# Patient Record
Sex: MALE | Race: WHITE | NOT HISPANIC OR LATINO | Employment: OTHER | URBAN - METROPOLITAN AREA
[De-identification: names, ages, dates, MRNs, and addresses within clinical notes are randomized per-mention and may not be internally consistent; named-entity substitution may affect disease eponyms.]

---

## 2022-11-09 ENCOUNTER — OFFICE VISIT (OUTPATIENT)
Dept: NEUROLOGY | Facility: CLINIC | Age: 77
End: 2022-11-09

## 2022-11-09 VITALS
DIASTOLIC BLOOD PRESSURE: 90 MMHG | OXYGEN SATURATION: 97 % | BODY MASS INDEX: 32.2 KG/M2 | WEIGHT: 230 LBS | HEIGHT: 71 IN | SYSTOLIC BLOOD PRESSURE: 150 MMHG | HEART RATE: 65 BPM

## 2022-11-09 DIAGNOSIS — G70.00 MYASTHENIA GRAVIS WITHOUT EXACERBATION: Primary | ICD-10-CM

## 2022-11-09 PROCEDURE — 99214 OFFICE O/P EST MOD 30 MIN: CPT | Performed by: PSYCHIATRY & NEUROLOGY

## 2022-11-09 RX ORDER — PYRIDOSTIGMINE BROMIDE 60 MG/1
60 TABLET ORAL 3 TIMES DAILY
COMMUNITY

## 2022-11-09 RX ORDER — LISINOPRIL AND HYDROCHLOROTHIAZIDE 25; 20 MG/1; MG/1
1 TABLET ORAL DAILY
COMMUNITY

## 2022-11-09 RX ORDER — ATORVASTATIN CALCIUM 10 MG/1
10 TABLET, FILM COATED ORAL DAILY
COMMUNITY

## 2022-11-09 RX ORDER — ALFUZOSIN HYDROCHLORIDE 10 MG/1
10 TABLET, EXTENDED RELEASE ORAL DAILY
COMMUNITY

## 2022-11-09 NOTE — PROGRESS NOTES
Subjective:     Notes by MA:  Patient here today for follow up of Myasthenia Gravis. Patient reports he is doing well on Mestinon, no new or worsening symptoms.    Patient ID: Leeroy Osuna is a 77 y.o. male.    History of Present Illness  The following portions of the patient's history were reviewed and updated as appropriate: allergies, current medications, past family history, past medical history, past social history, past surgical history and problem list.    Review of Systems   Constitutional: Negative for activity change, appetite change and fatigue.   HENT: Negative for facial swelling, trouble swallowing and voice change.    Eyes: Negative for photophobia, pain and visual disturbance.   Respiratory: Negative for chest tightness, shortness of breath and wheezing.    Cardiovascular: Negative for chest pain, palpitations and leg swelling.   Gastrointestinal: Negative for abdominal pain, nausea and vomiting.   Endocrine: Positive for cold intolerance (hands). Negative for heat intolerance.   Musculoskeletal: Positive for gait problem. Negative for arthralgias, back pain, joint swelling, myalgias, neck pain and neck stiffness.   Neurological: Negative for dizziness, tremors, seizures, syncope, facial asymmetry, speech difficulty, weakness, light-headedness, numbness and headaches.   Hematological: Does not bruise/bleed easily.   Psychiatric/Behavioral: Negative for agitation, behavioral problems, confusion, decreased concentration, dysphoric mood, hallucinations, self-injury, sleep disturbance and suicidal ideas. The patient is not nervous/anxious and is not hyperactive.         Objective:    Neurologic Exam     Mental Status   Oriented to person, place, and time.   Speech: speech is normal   Level of consciousness: alert  Knowledge: good.     Cranial Nerves   Cranial nerves II through XII intact.     Motor Exam   Muscle bulk: normal    Strength   Right neck flexion: 5/5  Left neck flexion: 5/5  Right neck  extension: 5/5  Left neck extension: 5/5  Right deltoid: 5/5  Left deltoid: 5/5  Right biceps: 5/5  Left biceps: 5/5  Right triceps: 5/5  Left triceps: 5/5  Right wrist flexion: 5/5  Left wrist flexion: 5/5  Right wrist extension: 5/5  Left wrist extension: 5/5  Right interossei: 5/5  Left interossei: 5/5  Right abdominals: 5/5  Left abdominals: 5/5  Right iliopsoas: 5/5  Left iliopsoas: 5/5  Right quadriceps: 5/5  Left quadriceps: 5/5  Right hamstrin/5  Left hamstrin/5  Right glutei: 5/5  Left glutei: 5/5  Right anterior tibial: 5/5  Left anterior tibial: 5/5  Right posterior tibial: 5/5  Left posterior tibial: 5/5  Right peroneal: 5/5  Left peroneal: 5/5  Right gastroc: 5/5  Left gastroc: 5/5    Gait, Coordination, and Reflexes     Reflexes   Right brachioradialis: 1+  Left brachioradialis: 1+  Right biceps: 1+  Left biceps: 1+  Right triceps: 1+  Left triceps: 1+  Right patellar: 1+  Left patellar: 1+  Right achilles: 1+  Left achilles: 1+  Right : 1+  Left : 1+      Physical Exam  Neurological:      Mental Status: He is oriented to person, place, and time.      Cranial Nerves: Cranial nerves 2-12 are intact.      Deep Tendon Reflexes:      Reflex Scores:       Tricep reflexes are 1+ on the right side and 1+ on the left side.       Bicep reflexes are 1+ on the right side and 1+ on the left side.       Brachioradialis reflexes are 1+ on the right side and 1+ on the left side.       Patellar reflexes are 1+ on the right side and 1+ on the left side.       Achilles reflexes are 1+ on the right side and 1+ on the left side.  Psychiatric:         Speech: Speech normal.         Assessment/Plan:     Diagnoses and all orders for this visit:    1. Myasthenia gravis without exacerbation (HCC) (Primary)     75-year-old gentleman with antibody positive myasthenia gravis.  Doing well with symptomatic therapy only.  Tolerating his Mestinon at 60 mg 3 times daily we will continue that unchanged.  Bowel issues  have improved significantly with the addition of psyllium.  I reviewed his previous work-ups with him and once again, we talked the about the importance of avoiding potentially exacerbating medications or situations.  Again, his chest CT revealed no residual thymoma or thymus gland.  Finally, we will continue symptomatic therapy only and will hold off on immune modulatory therapy if we can, especially in the light of his history of diabetes and prostate cancer.  We will see him back in 4 months, earlier if necessary.    30 minutes patient care time today.

## 2023-03-09 ENCOUNTER — OFFICE VISIT (OUTPATIENT)
Dept: NEUROLOGY | Facility: CLINIC | Age: 78
End: 2023-03-09
Payer: MEDICARE

## 2023-03-09 VITALS
SYSTOLIC BLOOD PRESSURE: 140 MMHG | HEIGHT: 71 IN | HEART RATE: 66 BPM | WEIGHT: 239 LBS | DIASTOLIC BLOOD PRESSURE: 80 MMHG | BODY MASS INDEX: 33.46 KG/M2 | OXYGEN SATURATION: 96 %

## 2023-03-09 DIAGNOSIS — G70.00 MYASTHENIA GRAVIS WITHOUT EXACERBATION: Primary | ICD-10-CM

## 2023-03-09 PROCEDURE — 99213 OFFICE O/P EST LOW 20 MIN: CPT | Performed by: PSYCHIATRY & NEUROLOGY

## 2023-03-09 RX ORDER — SODIUM PHOSPHATE,MONO-DIBASIC 19G-7G/118
ENEMA (ML) RECTAL
COMMUNITY

## 2023-03-09 RX ORDER — NAPROXEN SODIUM 220 MG
220 TABLET ORAL 2 TIMES DAILY PRN
COMMUNITY

## 2023-03-09 NOTE — PROGRESS NOTES
Notes by MA:  Patient presents today for myasthenia gravis. Pt reports no new or worsening symptoms.        Subjective:     Patient ID: Leeroy Osuna is a 77 y.o. male.    History of Present Illness  The following portions of the patient's history were reviewed and updated as appropriate: allergies, current medications, past family history, past medical history, past social history, past surgical history and problem list.    Review of Systems   Musculoskeletal: Negative for gait problem.   Neurological: Negative for dizziness, tremors, seizures, syncope, facial asymmetry, speech difficulty, weakness, light-headedness, numbness and headaches.   Psychiatric/Behavioral: Negative for agitation, behavioral problems, confusion, decreased concentration, dysphoric mood, hallucinations, self-injury, sleep disturbance and suicidal ideas. The patient is not nervous/anxious and is not hyperactive.         Objective:    Neurologic Exam  Awake alert pleasant cooperative cognitively preserved.  Speech and language function are normal.  Cranial nerves II through XII normal and symmetric.  No nystagmus.  No ptosis.  Speech is well articulated.  No dysphonia.  Motor exam reveals reasonable and symmetric tone bulk and power appropriate for age and activity level.  No drift.  Coordination testing reveals smooth and accurate finger-nose-finger and rapid alternating movements bilaterally and he is walking well.  Sensory examination is symmetric with respect to primary modalities.  Toes are downgoing.  Physical Exam    Assessment/Plan:     Diagnoses and all orders for this visit:    1. Myasthenia gravis without exacerbation (HCC) (Primary)       Stable, doing well on Mestinon 60 mg 3 times daily.  The GI issues he experienced are responding very nicely to over-the-counter psyllium which he continues.  No new neurologic findings.  We talked about the diagnosis and treatment options once again.  No changes indicated today.  We will see  him back in 6 months and have encouraged him to call in the interim with any problems or questions.

## 2023-09-12 ENCOUNTER — OFFICE VISIT (OUTPATIENT)
Dept: NEUROLOGY | Facility: CLINIC | Age: 78
End: 2023-09-12
Payer: MEDICARE

## 2023-09-12 VITALS
BODY MASS INDEX: 33.38 KG/M2 | DIASTOLIC BLOOD PRESSURE: 74 MMHG | HEART RATE: 70 BPM | WEIGHT: 238.4 LBS | SYSTOLIC BLOOD PRESSURE: 130 MMHG | OXYGEN SATURATION: 96 % | HEIGHT: 71 IN

## 2023-09-12 DIAGNOSIS — G70.00 MYASTHENIA GRAVIS WITHOUT EXACERBATION: Primary | ICD-10-CM

## 2023-09-12 PROCEDURE — 1160F RVW MEDS BY RX/DR IN RCRD: CPT | Performed by: PSYCHIATRY & NEUROLOGY

## 2023-09-12 PROCEDURE — 99213 OFFICE O/P EST LOW 20 MIN: CPT | Performed by: PSYCHIATRY & NEUROLOGY

## 2023-09-12 PROCEDURE — 1159F MED LIST DOCD IN RCRD: CPT | Performed by: PSYCHIATRY & NEUROLOGY

## 2023-09-12 NOTE — PROGRESS NOTES
Notes by MA:  Pt is here today for follow up on Myasthenia Gravis. He feels that he is stable right now.      Subjective:     Patient ID: Leeroy Osuna is a 77 y.o. male.    History of Present Illness  The following portions of the patient's history were reviewed and updated as appropriate: allergies, current medications, past family history, past medical history, past social history, past surgical history, and problem list.    Review of Systems   Constitutional:  Positive for fatigue. Negative for activity change and appetite change.   HENT:  Negative for facial swelling, trouble swallowing and voice change.    Eyes:  Positive for visual disturbance (will see a blck spot every so often). Negative for photophobia and pain.   Respiratory:  Negative for chest tightness, shortness of breath and wheezing.    Cardiovascular:  Negative for chest pain, palpitations and leg swelling.   Gastrointestinal:  Negative for abdominal pain, nausea and vomiting.   Endocrine: Positive for cold intolerance. Negative for heat intolerance.   Musculoskeletal:  Negative for arthralgias, back pain, gait problem, joint swelling, myalgias, neck pain and neck stiffness.   Neurological:  Positive for weakness. Negative for dizziness, tremors, seizures, syncope, facial asymmetry, speech difficulty, light-headedness, numbness and headaches.   Hematological:  Does not bruise/bleed easily.   Psychiatric/Behavioral:  Positive for hallucinations (dreams, he thinks from metformin) and sleep disturbance (leg cramps). Negative for agitation, behavioral problems, confusion, decreased concentration, dysphoric mood, self-injury and suicidal ideas. The patient is not nervous/anxious and is not hyperactive.       Objective:    Neurologic Exam  Awake alert pleasant cooperative cognitively preserved. Speech and language function are normal. Cranial nerves II through XII normal and symmetric. No nystagmus. No ptosis. Speech is well articulated. No  dysphonia. Motor exam reveals reasonable and symmetric tone bulk and power appropriate for age and activity level. No drift. Coordination testing reveals smooth and accurate finger-nose-finger and rapid alternating movements bilaterally and he is walking well. Sensory examination is symmetric with respect to primary modalities. Toes are downgoing.   Physical Exam    Assessment/Plan:     Diagnoses and all orders for this visit:    1. Myasthenia gravis without exacerbation (Primary)     Stable and continuing to do very well on Mestinon only 60 mg 3 times daily.  He still using psyllium which is helped his GI complaints.  I reassured him that no further or additional treatment is necessary at this time.  His MG ADL score is still 0.  No changes and we will see him back in 6 months, earlier if necessary.

## 2024-03-12 ENCOUNTER — OFFICE VISIT (OUTPATIENT)
Dept: NEUROLOGY | Facility: CLINIC | Age: 79
End: 2024-03-12
Payer: MEDICARE

## 2024-03-12 VITALS
DIASTOLIC BLOOD PRESSURE: 82 MMHG | WEIGHT: 248.6 LBS | HEIGHT: 71 IN | BODY MASS INDEX: 34.8 KG/M2 | SYSTOLIC BLOOD PRESSURE: 140 MMHG | HEART RATE: 78 BPM | OXYGEN SATURATION: 96 %

## 2024-03-12 DIAGNOSIS — G70.00 MYASTHENIA GRAVIS WITHOUT EXACERBATION: Primary | ICD-10-CM

## 2024-03-12 PROCEDURE — 1160F RVW MEDS BY RX/DR IN RCRD: CPT | Performed by: PSYCHIATRY & NEUROLOGY

## 2024-03-12 PROCEDURE — 1159F MED LIST DOCD IN RCRD: CPT | Performed by: PSYCHIATRY & NEUROLOGY

## 2024-03-12 PROCEDURE — 99213 OFFICE O/P EST LOW 20 MIN: CPT | Performed by: PSYCHIATRY & NEUROLOGY

## 2024-03-12 NOTE — PROGRESS NOTES
Notes by MA:  Pt is here today for a follow up for myasthenia gravis. He feels that he is stable.      Subjective:     Patient ID: Leeroy Osuna is a 78 y.o. male.    History of Present Illness  The following portions of the patient's history were reviewed and updated as appropriate: allergies, current medications, past family history, past medical history, past social history, past surgical history, and problem list.    Review of Systems   Musculoskeletal:  Negative for gait problem.   Neurological:  Negative for dizziness, tremors, seizures, syncope, facial asymmetry, speech difficulty, weakness, light-headedness, numbness and headaches.   Psychiatric/Behavioral:  Negative for agitation, behavioral problems, confusion, decreased concentration, dysphoric mood, hallucinations, self-injury, sleep disturbance (sleep apnea) and suicidal ideas. The patient is not nervous/anxious and is not hyperactive.         Objective:    Neurologic Exam  Awake alert pleasant cooperative cognitively preserved. Speech and language function are normal. Cranial nerves II through XII normal and symmetric. No nystagmus. No ptosis. Speech is well articulated. No dysphonia. Motor exam reveals reasonable and symmetric tone bulk and power appropriate for age and activity level. No drift. Coordination testing reveals smooth and accurate finger-nose-finger and rapid alternating movements bilaterally and he is walking well. Sensory examination is symmetric with respect to primary modalities. Toes are downgoing.    Physical Exam    Assessment/Plan:     Diagnoses and all orders for this visit:    1. Myasthenia gravis without exacerbation (Primary)     Stable and continuing to do very well on Mestinon  60 mg 3 times daily.  No new symptoms suggesting worsening or exacerbating myasthenia at this time. His MG ADL score is still 0. No changes and we will see him back in 6 months, earlier if necessary.

## 2024-09-17 ENCOUNTER — OFFICE VISIT (OUTPATIENT)
Dept: NEUROLOGY | Facility: CLINIC | Age: 79
End: 2024-09-17
Payer: MEDICARE

## 2024-09-17 VITALS
OXYGEN SATURATION: 98 % | WEIGHT: 241 LBS | SYSTOLIC BLOOD PRESSURE: 120 MMHG | HEART RATE: 72 BPM | BODY MASS INDEX: 33.63 KG/M2 | DIASTOLIC BLOOD PRESSURE: 82 MMHG

## 2024-09-17 DIAGNOSIS — G70.00 MYASTHENIA GRAVIS WITHOUT EXACERBATION: Primary | ICD-10-CM

## 2024-10-03 ENCOUNTER — TELEPHONE (OUTPATIENT)
Dept: NEUROLOGY | Facility: CLINIC | Age: 79
End: 2024-10-03
Payer: COMMERCIAL

## 2024-10-03 NOTE — TELEPHONE ENCOUNTER
Patient states it has gotten difficult to swallow unless he chews very small pieces. It has happened enough that it has made him really watch what he eats and how he chews. Informed patient that Dr. Pratt was out of the office and I would relay the message to him and get back with him on Monday.

## 2024-10-03 NOTE — TELEPHONE ENCOUNTER
PATIENT STATES AT LAST WEEK OV,  HE WAS ASKED IF HE HAD TROUBLE SWALLOWING -  AT THAT TIME, HE DID NOT.    HE NOW REPORTS THAT IF HE TAKES TOO BIG A BITE, HE HAS TROUBLE  SWALLOWING AND HAS TO BRING THE BITE BACK UP.    PLEASE ADVISE PATIENT    THANK YOU

## 2024-10-09 ENCOUNTER — TELEPHONE (OUTPATIENT)
Dept: NEUROLOGY | Facility: CLINIC | Age: 79
End: 2024-10-09
Payer: COMMERCIAL

## 2024-10-09 NOTE — TELEPHONE ENCOUNTER
Provider: GABRIELA DRAPER MD    Caller: LLOYD    Relationship to Patient: [facility]    Phone Number: [phone]    Reason for Call: CALLING TO GET A COPY OF THE LAST OV NOTE FROM 9-17-24.  STATED FAX NUMBER [phone]    When was the patient last seen: 9-17-24      PLEASE ADVISE

## 2025-03-18 ENCOUNTER — OFFICE VISIT (OUTPATIENT)
Dept: NEUROLOGY | Facility: CLINIC | Age: 80
End: 2025-03-18
Payer: MEDICARE

## 2025-03-18 VITALS
DIASTOLIC BLOOD PRESSURE: 72 MMHG | BODY MASS INDEX: 33.82 KG/M2 | SYSTOLIC BLOOD PRESSURE: 140 MMHG | HEART RATE: 73 BPM | OXYGEN SATURATION: 96 % | WEIGHT: 242.4 LBS

## 2025-03-18 DIAGNOSIS — G70.00 MYASTHENIA GRAVIS WITHOUT EXACERBATION: Primary | ICD-10-CM

## 2025-03-18 NOTE — PROGRESS NOTES
Notes by [facility]:  Mr Osuna is here for a follow up appointment today. His wife accompanies him today.    Subjective:     Patient ID: Leeroy Osuna is a 79 y.o. male.    History of Present Illness  The following portions of the patient's history were reviewed and updated as appropriate: allergies, current medications, past family history, past medical history, past social history, past surgical history, and problem list.    Review of Systems   Eyes:  Positive for discharge.        Objective:    Neurological Exam   Awake alert pleasant cooperative cognitively preserved. Speech and language function are normal. Cranial nerves II through XII normal and symmetric. No nystagmus. No ptosis. Speech is well articulated. No dysphonia. Motor exam reveals reasonable and symmetric tone bulk and power appropriate for age and activity level. No drift. Coordination testing reveals smooth and accurate finger-nose-finger and rapid alternating movements bilaterally and he is walking well. Sensory examination is symmetric with respect to primary modalities. Toes are downgoing.    Physical Exam    Assessment/Plan:     Diagnoses and all orders for this visit:    1. Myasthenia gravis without exacerbation (Primary)     Still doing very well on only Mestinon, currently 90 mg 3 times daily.  He is tolerating it well with only a minor increase in secretions.  His MG ADL score is 0.  No changes are being made.  Will see him back in 6 months, earlier if necessary.